# Patient Record
Sex: MALE
[De-identification: names, ages, dates, MRNs, and addresses within clinical notes are randomized per-mention and may not be internally consistent; named-entity substitution may affect disease eponyms.]

---

## 2022-03-09 PROBLEM — Z00.00 ENCOUNTER FOR PREVENTIVE HEALTH EXAMINATION: Status: ACTIVE | Noted: 2022-03-09

## 2022-03-10 ENCOUNTER — APPOINTMENT (OUTPATIENT)
Dept: UROLOGY | Facility: CLINIC | Age: 42
End: 2022-03-10
Payer: COMMERCIAL

## 2022-03-10 VITALS
BODY MASS INDEX: 23.1 KG/M2 | TEMPERATURE: 97.6 F | DIASTOLIC BLOOD PRESSURE: 75 MMHG | HEIGHT: 74 IN | WEIGHT: 180 LBS | SYSTOLIC BLOOD PRESSURE: 113 MMHG | HEART RATE: 51 BPM

## 2022-03-10 DIAGNOSIS — E29.1 TESTICULAR HYPOFUNCTION: ICD-10-CM

## 2022-03-10 PROCEDURE — 99204 OFFICE O/P NEW MOD 45 MIN: CPT

## 2022-03-10 NOTE — HISTORY OF PRESENT ILLNESS
[FreeTextEntry1] : Mr. COLLIN MELENEDZ is here due failure to conceive (Vi Beganovic 38 years)\par for 1 year\par 2 previous miscarriage naturally \par \par IUI X2 at [facility] Dr. Devaughn Cooley MD\par 10/2021\par 11/2021\par  \par Failed IVF September 2020 [facility] with [facility]\par 6 EGGS retried . 5 fertilized . 3 after day 5 (all were abnormal trisomoy)\par \par Now undergoing IVF with  Devaughn Cooley MD ultrasound proven 12-13 mature eggs\par Tomorrow undergoing IVF with fresh ejaculate \par \par female partner evaluation is normal \par \par Dr. Johanne Rasheed MD his urologist in [place] reported no varicoceles\par \par patient has good libido\par normal erection \par normal ejaculation\par \par No smoking  No alcohol \par \par No children from current or previous marriages \par \par he is taking Cabergoline 200 twice/ week\par pituitary microadenoma (golf size ??)\par  medically treated and follow up with brain MRI and visual field exam \par \par he has surgical history of left inguinal hernia repair 8 years ago \par \par semen analysis done on 6/2021\par \par Volume 4ml\par concentration 29 mil/ml\par motility 42% \par \par SCFA 33% Hihg

## 2022-03-10 NOTE — PHYSICAL EXAM
[Urethral Meatus] : meatus normal [Penis Abnormality] : normal uncircumcised penis [Urinary Bladder Findings] : the bladder was normal on palpation [Scrotum] : the scrotum was normal [Epididymis] : the epididymides were normal [Testes Tenderness] : no tenderness of the testes [Testes Mass (___cm)] : there were no testicular masses [FreeTextEntry1] : large right inguinal hernia. 16cc firm tetes x 2. no varicocele noted.

## 2022-03-10 NOTE — ASSESSMENT
[FreeTextEntry1] : hypogonadism\par on cabergoline\par high DFI\par The significance of the elevation in the DNA fragmentation index in light of the failure of multiple IVF attempts was discussed at length. He understands that there is some evidence suggesting that marked elevations in DFI can also lead to difficulties with IVF success. Variables affecting DFI, including dietary changes, infection and varicocele were discussed. He understands that there is reasonable evidence suggesting improvements in DFI following varicocele repair, which may potentially lead to improvements in IVF success rates and even potentially natural conception. In addition, we spoke about the use of testicular sperm in IVF in patients with elevated DFI and IVF failures. He understands that DFI testing in testicular sperm may show improvements, and that , anecdotally, there are patients who have success with IVF using testicular sperm despite multiple previous failures with ejaculated sperm.\par TT FSH E2 LH \par if repeat IVF failure repeat DFI testing

## 2022-03-10 NOTE — HISTORY OF PRESENT ILLNESS
[FreeTextEntry1] : Mr. COLLIN MELENDEZ is here due failure to conceive (Vi Beganovic 38 years)\par for 1 year\par 2 previous miscarriage naturally \par \par IUI X2 at [facility] Dr. Devaughn Cooley MD\par 10/2021\par 11/2021\par  \par Failed IVF September 2020 [facility] with [facility]\par 6 EGGS retried . 5 fertilized . 3 after day 5 (all were abnormal trisomoy)\par \par Now undergoing IVF with  Devaughn Cooley MD ultrasound proven 12-13 mature eggs\par Tomorrow undergoing IVF with fresh ejaculate \par \par female partner evaluation is normal \par \par Dr. Johanne Rasheed MD his urologist in [place] reported no varicoceles\par \par patient has good libido\par normal erection \par normal ejaculation\par \par No smoking  No alcohol \par \par No children from current or previous marriages \par \par he is taking Cabergoline 200 twice/ week\par pituitary microadenoma (golf size ??)\par  medically treated and follow up with brain MRI and visual field exam \par \par he has surgical history of left inguinal hernia repair 8 years ago \par \par semen analysis done on 6/2021\par \par Volume 4ml\par concentration 29 mil/ml\par motility 42% \par \par SCFA 33% Hihg